# Patient Record
Sex: FEMALE | Race: OTHER | HISPANIC OR LATINO | URBAN - METROPOLITAN AREA
[De-identification: names, ages, dates, MRNs, and addresses within clinical notes are randomized per-mention and may not be internally consistent; named-entity substitution may affect disease eponyms.]

---

## 2022-10-26 ENCOUNTER — EMERGENCY (EMERGENCY)
Facility: HOSPITAL | Age: 47
LOS: 1 days | Discharge: HOME | End: 2022-10-26
Attending: STUDENT IN AN ORGANIZED HEALTH CARE EDUCATION/TRAINING PROGRAM | Admitting: EMERGENCY MEDICINE
Payer: MEDICAID

## 2022-10-26 VITALS
SYSTOLIC BLOOD PRESSURE: 129 MMHG | HEART RATE: 65 BPM | OXYGEN SATURATION: 100 % | TEMPERATURE: 98 F | DIASTOLIC BLOOD PRESSURE: 81 MMHG | RESPIRATION RATE: 20 BRPM

## 2022-10-26 LAB
ALBUMIN SERPL ELPH-MCNC: 4.2 G/DL — SIGNIFICANT CHANGE UP (ref 3.3–5)
ALP SERPL-CCNC: 77 U/L — SIGNIFICANT CHANGE UP (ref 40–120)
ALT FLD-CCNC: 17 U/L — SIGNIFICANT CHANGE UP (ref 4–33)
ANION GAP SERPL CALC-SCNC: 12 MMOL/L — SIGNIFICANT CHANGE UP (ref 7–14)
APPEARANCE UR: CLEAR — SIGNIFICANT CHANGE UP
AST SERPL-CCNC: 20 U/L — SIGNIFICANT CHANGE UP (ref 4–32)
B PERT DNA SPEC QL NAA+PROBE: SIGNIFICANT CHANGE UP
B PERT+PARAPERT DNA PNL SPEC NAA+PROBE: SIGNIFICANT CHANGE UP
BASOPHILS # BLD AUTO: 0.05 K/UL — SIGNIFICANT CHANGE UP (ref 0–0.2)
BASOPHILS NFR BLD AUTO: 0.6 % — SIGNIFICANT CHANGE UP (ref 0–2)
BILIRUB SERPL-MCNC: 0.3 MG/DL — SIGNIFICANT CHANGE UP (ref 0.2–1.2)
BILIRUB UR-MCNC: NEGATIVE — SIGNIFICANT CHANGE UP
BORDETELLA PARAPERTUSSIS (RAPRVP): SIGNIFICANT CHANGE UP
BUN SERPL-MCNC: 10 MG/DL — SIGNIFICANT CHANGE UP (ref 7–23)
C PNEUM DNA SPEC QL NAA+PROBE: SIGNIFICANT CHANGE UP
CALCIUM SERPL-MCNC: 8.8 MG/DL — SIGNIFICANT CHANGE UP (ref 8.4–10.5)
CHLORIDE SERPL-SCNC: 103 MMOL/L — SIGNIFICANT CHANGE UP (ref 98–107)
CO2 SERPL-SCNC: 25 MMOL/L — SIGNIFICANT CHANGE UP (ref 22–31)
COLOR SPEC: COLORLESS — SIGNIFICANT CHANGE UP
CREAT SERPL-MCNC: 0.78 MG/DL — SIGNIFICANT CHANGE UP (ref 0.5–1.3)
DIFF PNL FLD: NEGATIVE — SIGNIFICANT CHANGE UP
EGFR: 95 ML/MIN/1.73M2 — SIGNIFICANT CHANGE UP
EOSINOPHIL # BLD AUTO: 0.1 K/UL — SIGNIFICANT CHANGE UP (ref 0–0.5)
EOSINOPHIL NFR BLD AUTO: 1.2 % — SIGNIFICANT CHANGE UP (ref 0–6)
FLUAV SUBTYP SPEC NAA+PROBE: SIGNIFICANT CHANGE UP
FLUBV RNA SPEC QL NAA+PROBE: SIGNIFICANT CHANGE UP
GLUCOSE SERPL-MCNC: 91 MG/DL — SIGNIFICANT CHANGE UP (ref 70–99)
GLUCOSE UR QL: NEGATIVE — SIGNIFICANT CHANGE UP
HADV DNA SPEC QL NAA+PROBE: SIGNIFICANT CHANGE UP
HCG SERPL-ACNC: <5 MIU/ML — SIGNIFICANT CHANGE UP
HCOV 229E RNA SPEC QL NAA+PROBE: SIGNIFICANT CHANGE UP
HCOV HKU1 RNA SPEC QL NAA+PROBE: SIGNIFICANT CHANGE UP
HCOV NL63 RNA SPEC QL NAA+PROBE: SIGNIFICANT CHANGE UP
HCOV OC43 RNA SPEC QL NAA+PROBE: SIGNIFICANT CHANGE UP
HCT VFR BLD CALC: 38.6 % — SIGNIFICANT CHANGE UP (ref 34.5–45)
HGB BLD-MCNC: 13 G/DL — SIGNIFICANT CHANGE UP (ref 11.5–15.5)
HMPV RNA SPEC QL NAA+PROBE: SIGNIFICANT CHANGE UP
HPIV1 RNA SPEC QL NAA+PROBE: SIGNIFICANT CHANGE UP
HPIV2 RNA SPEC QL NAA+PROBE: SIGNIFICANT CHANGE UP
HPIV3 RNA SPEC QL NAA+PROBE: SIGNIFICANT CHANGE UP
HPIV4 RNA SPEC QL NAA+PROBE: SIGNIFICANT CHANGE UP
IANC: 5.48 K/UL — SIGNIFICANT CHANGE UP (ref 1.8–7.4)
IMM GRANULOCYTES NFR BLD AUTO: 0.3 % — SIGNIFICANT CHANGE UP (ref 0–0.9)
KETONES UR-MCNC: NEGATIVE — SIGNIFICANT CHANGE UP
LEUKOCYTE ESTERASE UR-ACNC: NEGATIVE — SIGNIFICANT CHANGE UP
LIDOCAIN IGE QN: 14 U/L — SIGNIFICANT CHANGE UP (ref 7–60)
LYMPHOCYTES # BLD AUTO: 2.28 K/UL — SIGNIFICANT CHANGE UP (ref 1–3.3)
LYMPHOCYTES # BLD AUTO: 26.5 % — SIGNIFICANT CHANGE UP (ref 13–44)
M PNEUMO DNA SPEC QL NAA+PROBE: SIGNIFICANT CHANGE UP
MCHC RBC-ENTMCNC: 28.8 PG — SIGNIFICANT CHANGE UP (ref 27–34)
MCHC RBC-ENTMCNC: 33.7 GM/DL — SIGNIFICANT CHANGE UP (ref 32–36)
MCV RBC AUTO: 85.6 FL — SIGNIFICANT CHANGE UP (ref 80–100)
MONOCYTES # BLD AUTO: 0.65 K/UL — SIGNIFICANT CHANGE UP (ref 0–0.9)
MONOCYTES NFR BLD AUTO: 7.6 % — SIGNIFICANT CHANGE UP (ref 2–14)
NEUTROPHILS # BLD AUTO: 5.48 K/UL — SIGNIFICANT CHANGE UP (ref 1.8–7.4)
NEUTROPHILS NFR BLD AUTO: 63.8 % — SIGNIFICANT CHANGE UP (ref 43–77)
NITRITE UR-MCNC: NEGATIVE — SIGNIFICANT CHANGE UP
NRBC # BLD: 0 /100 WBCS — SIGNIFICANT CHANGE UP (ref 0–0)
NRBC # FLD: 0 K/UL — SIGNIFICANT CHANGE UP (ref 0–0)
PH UR: 6 — SIGNIFICANT CHANGE UP (ref 5–8)
PLATELET # BLD AUTO: 339 K/UL — SIGNIFICANT CHANGE UP (ref 150–400)
POTASSIUM SERPL-MCNC: 3.3 MMOL/L — LOW (ref 3.5–5.3)
POTASSIUM SERPL-SCNC: 3.3 MMOL/L — LOW (ref 3.5–5.3)
PROT SERPL-MCNC: 7.4 G/DL — SIGNIFICANT CHANGE UP (ref 6–8.3)
PROT UR-MCNC: NEGATIVE — SIGNIFICANT CHANGE UP
RAPID RVP RESULT: SIGNIFICANT CHANGE UP
RBC # BLD: 4.51 M/UL — SIGNIFICANT CHANGE UP (ref 3.8–5.2)
RBC # FLD: 13.3 % — SIGNIFICANT CHANGE UP (ref 10.3–14.5)
RSV RNA SPEC QL NAA+PROBE: SIGNIFICANT CHANGE UP
RV+EV RNA SPEC QL NAA+PROBE: SIGNIFICANT CHANGE UP
SARS-COV-2 RNA SPEC QL NAA+PROBE: SIGNIFICANT CHANGE UP
SODIUM SERPL-SCNC: 140 MMOL/L — SIGNIFICANT CHANGE UP (ref 135–145)
SP GR SPEC: 1.01 — LOW (ref 1.01–1.05)
UROBILINOGEN FLD QL: SIGNIFICANT CHANGE UP
WBC # BLD: 8.59 K/UL — SIGNIFICANT CHANGE UP (ref 3.8–10.5)
WBC # FLD AUTO: 8.59 K/UL — SIGNIFICANT CHANGE UP (ref 3.8–10.5)

## 2022-10-26 PROCEDURE — G1004: CPT

## 2022-10-26 PROCEDURE — 76830 TRANSVAGINAL US NON-OB: CPT | Mod: 26

## 2022-10-26 PROCEDURE — 74177 CT ABD & PELVIS W/CONTRAST: CPT | Mod: 26,ME

## 2022-10-26 PROCEDURE — 99284 EMERGENCY DEPT VISIT MOD MDM: CPT

## 2022-10-26 RX ORDER — KETOROLAC TROMETHAMINE 30 MG/ML
15 SYRINGE (ML) INJECTION ONCE
Refills: 0 | Status: DISCONTINUED | OUTPATIENT
Start: 2022-10-26 | End: 2022-10-26

## 2022-10-26 RX ADMIN — Medication 15 MILLIGRAM(S): at 21:33

## 2022-10-26 NOTE — ED PROVIDER NOTE - PATIENT PORTAL LINK FT
You can access the FollowMyHealth Patient Portal offered by [facility] by registering at the following website: [email or web address]. By joining [employer]’s FollowMyHealth portal, you will also be able to view your health information using other applications (apps) compatible with our system.

## 2022-10-26 NOTE — ED PROVIDER NOTE - OBJECTIVE STATEMENT
47F, no sig pmh, who presents with abdominal pain. Reports intermittent RLQ abdominal pain for the past 3 days. Non-radiating, non-positional. No dysuria, hematuria. Does not have regular menstruations because of her IUD. Was sent in by her PMD for rule out appy. No vaginal bleeding/discharge. No headaches, fevers, chills, cough, sputum, cp, sob, nvd, recent travel, trauma, syncope.

## 2022-10-26 NOTE — ED PROVIDER NOTE - PROGRESS NOTE DETAILS
Desirae att: The pt's pain resolved, COnsulted with gyn whose recs are as follows: - No acute GYN intervention   - f/u outpatient for TVUS in 3 months to assess for cyst resolution  - Pt may follow up with [facility] [phone].   - Return precautions reviewed  - Recommend NSAIDs for analgesia, alternating with Tylenol. Recommend heat packs.

## 2022-10-26 NOTE — ED PROVIDER NOTE - NSFOLLOWUPINSTRUCTIONS_ED_ALL_ED_FT
- f/u outpatient for TVUS in 3 months to assess for cyst resolution  - Pt may follow up with [facility] [phone].   - Recommend NSAIDs for analgesia, alternating with Tylenol. Recommend heat packs.

## 2022-10-26 NOTE — ED PROVIDER NOTE - CLINICAL SUMMARY MEDICAL DECISION MAKING FREE TEXT BOX
47F, no sig pmh, who presents with a few days of RLQ pain. Sent in by PMD for rule out appendicitis. No urinary sxs. No vaginal sxs. No hx of kidney stones. Will obtain labs, urine, CT imaging.

## 2022-10-26 NOTE — ED ADULT NURSE NOTE - OBJECTIVE STATEMENT
break coverage rn- pt c/o RLQ abdominal pain x 6 days. appears to be in no distress. denies any complaints. no respiratory distress noted. 20 gauge iv placed in the left ac, labs sent. awaiting further plan

## 2022-10-27 VITALS
RESPIRATION RATE: 18 BRPM | DIASTOLIC BLOOD PRESSURE: 75 MMHG | SYSTOLIC BLOOD PRESSURE: 104 MMHG | TEMPERATURE: 98 F | OXYGEN SATURATION: 98 % | HEART RATE: 64 BPM

## 2022-10-27 PROCEDURE — 99243 OFF/OP CNSLTJ NEW/EST LOW 30: CPT | Mod: GC

## 2022-10-27 NOTE — CONSULT NOTE ADULT - SUBJECTIVE AND OBJECTIVE BOX
МАРИЯ LO  47y  Female [id number]    47 year old  LMP 9 years ago (IUD in place) presenting with RLQ pain for 2 weeks. Pt describes the pain as dull, aching in nature. Not radiating. Waxing and waning, but pt feels that it is there most of the time. Pt was seen by her PCP today and they recommended ED evaluation to rule out appendicitis. Pt denies nausea, vomiting, denies fevers/chills. Regular bowel movements, last yesterday. Denies diarrhea or sick contacts. No vaginal bleeding, no abnormal vaginal discharge.     Name of GYN Physician: none     Past OB:  CS x1,  x2, SAB x2     Past gyn: Denies fibroids, cysts, endometriosis, STI's, Abnormal pap smears     Home meds: none     Allergies: NKDA     PAST MEDICAL & SURGICAL HISTORY:  - No medical history   - CS x1     Social History:  Denies smoking use, drug use, alcohol use.       Vital Signs Last 24 Hrs  T(C): 36.9 (26 Oct 2022 18:21), Max: 36.9 (26 Oct 2022 18:21)  T(F): 98.4 (26 Oct 2022 18:21), Max: 98.4 (26 Oct 2022 18:21)  HR: 58 (26 Oct 2022 18:21) (58 - 65)  BP: 146/98 (26 Oct 2022 18:21) (129/81 - 146/98)  BP(mean): --  RR: 16 (26 Oct 2022 18:21) (16 - 20)  SpO2: 100% (26 Oct 2022 18:21) (100% - 100%)    Parameters below as of 26 Oct 2022 18:21  Patient On (Oxygen Delivery Method): room air        Physical Exam:   General: sitting comfortably in bed, NAD   CV: RR S1S2 no m/r/g  Lungs: CTA b/l, good air flow b/l   Back: No CVA tenderness  Abd: Soft, non-tender, non-distended.  Bowel sounds present.    :  No bleeding on pad.    External labia wnl.  Bimanual exam with no cervical motion tenderness, uterus wnl, adnexa non palpable b/l.  Cervix closed vs. Cervix dilated    cm   Speculum Exam: No active bleeding from os.  Physiologic discharge.    Ext: non-tender b/l, no edema     LABS:                              13.0   8.59  )-----------( 339      ( 26 Oct 2022 15:20 )             38.6     10-26    140  |  103  |  10  ----------------------------<  91  3.3<L>   |  25  |  0.78    Ca    8.8      26 Oct 2022 15:20    TPro  7.4  /  Alb  4.2  /  TBili  0.3  /  DBili  x   /  AST  20  /  ALT  17  /  AlkPhos  77  10-26    I&O's Detail      Urinalysis Basic - ( 26 Oct 2022 16:00 )    Color: Colorless / Appearance: Clear / S.006 / pH: x  Gluc: x / Ketone: Negative  / Bili: Negative / Urobili: <2 mg/dL   Blood: x / Protein: Negative / Nitrite: Negative   Leuk Esterase: Negative / RBC: x / WBC x   Sq Epi: x / Non Sq Epi: x / Bacteria: x        RADIOLOGY & ADDITIONAL STUDIES: МАРИЯ LO  47y  Female [id number]    47 year old  LMP 9 years ago (IUD in place) presenting with RLQ pain for 2 weeks. Pt describes the pain as dull, aching in nature. Not radiating. Waxing and waning, but pt feels that it is there most of the time. Pt was seen by her PCP today and they recommended ED evaluation to rule out appendicitis. Pt denies nausea, vomiting, denies fevers/chills. Regular bowel movements, last yesterday. Denies diarrhea or sick contacts. No vaginal bleeding, no abnormal vaginal discharge.     Name of GYN Physician: none     Past OB:  CS x1,  x2, SAB x2     Past gyn: Denies fibroids, cysts, endometriosis, STI's, Abnormal pap smears     Home meds: none     Allergies: NKDA     PAST MEDICAL & SURGICAL HISTORY:  - No medical history   - CS x1     Social History:  Denies smoking use, drug use, alcohol use.       Vital Signs Last 24 Hrs  T(C): 36.9 (26 Oct 2022 18:21), Max: 36.9 (26 Oct 2022 18:21)  T(F): 98.4 (26 Oct 2022 18:21), Max: 98.4 (26 Oct 2022 18:21)  HR: 58 (26 Oct 2022 18:21) (58 - 65)  BP: 146/98 (26 Oct 2022 18:21) (129/81 - 146/98)  BP(mean): --  RR: 16 (26 Oct 2022 18:21) (16 - 20)  SpO2: 100% (26 Oct 2022 18:21) (100% - 100%)    Parameters below as of 26 Oct 2022 18:21  Patient On (Oxygen Delivery Method): room air        Physical Exam:   General: sitting comfortably in bed, NAD   Abd: Soft, non-tender, non-distended.    :  No bleeding on pad.    External labia wnl.  Bimanual exam with no cervical motion tenderness, uterus wnl, adnexa non palpable b/l.  Cervix closed   Speculum Exam: No active bleeding from os.  Physiologic discharge.    Ext: non-tender b/l, no edema     LABS:                              13.0   8.59  )-----------( 339      ( 26 Oct 2022 15:20 )             38.6     10-    140  |  103  |  10  ----------------------------<  91  3.3<L>   |  25  |  0.78    Ca    8.8      26 Oct 2022 15:20    TPro  7.4  /  Alb  4.2  /  TBili  0.3  /  DBili  x   /  AST  20  /  ALT  17  /  AlkPhos  77  10-26    I&O's Detail      Urinalysis Basic - ( 26 Oct 2022 16:00 )    Color: Colorless / Appearance: Clear / S.006 / pH: x  Gluc: x / Ketone: Negative  / Bili: Negative / Urobili: <2 mg/dL   Blood: x / Protein: Negative / Nitrite: Negative   Leuk Esterase: Negative / RBC: x / WBC x   Sq Epi: x / Non Sq Epi: x / Bacteria: x        RADIOLOGY & ADDITIONAL STUDIES:    < from: CT Abdomen and Pelvis w/ IV Cont (10.26.22 @ 19:02) >  CT ABDOMEN AND PELVIS IC                          PROCEDURE DATE:  10/26/2022          INTERPRETATION:  CLINICAL INFORMATION: Right lower quadrant abdominal pain    COMPARISON: None.    CONTRAST/COMPLICATIONS:  IV Contrast: Omnipaque 350  90 cc administered   10 cc discarded  Oral Contrast: NONE  Complications: None reported at time of study completion    PROCEDURE:  CT of the Abdomen and Pelvis was performed.  Sagittal and coronal reformats were performed.    FINDINGS:  LOWER CHEST: Within normal limits.    LIVER: Within normal limits.  BILE DUCTS: Normal caliber.  GALLBLADDER: Within normal limits.  SPLEEN: Within normal limits.  PANCREAS: Within normal limits.  ADRENALS: Within normal limits.  KIDNEYS/URETERS: Withinnormal limits. No evidence for a ureteral   calculus. No hydronephrosis.    BLADDER: Within normal limits.  REPRODUCTIVE ORGANS: IUD in the endometrium. The left adnexa appears   grossly unremarkable. 4.1 cm hypodense lesion in the right adnexa,   suggestive of a right ovarian cyst. Pelvic ultrasound may be pursued for   further evaluation.    BOWEL: No bowel obstruction or grossly thickened bowel wall. Appendix   within normal limits.  PERITONEUM: No free air or ascites.  VESSELS: Within normal limits.  RETROPERITONEUM/LYMPH NODES: No lymphadenopathy.  ABDOMINAL WALL: Small fat-containing umbilical hernia.  BONES: Mild degenerative spondylosis. Grade 1 anterolisthesis at L5-S1.   Bilateral L5 spondylolysis.    IMPRESSION:  4.1 cm hypodense lesion in the right adnexa, suggestive of a right   ovarian cyst. Pelvic ultrasound may be pursued for further evaluation.    No bowel obstruction or grossly thickened bowel wall. Appendix within   normal limits.    --- End of Report ---          < end of copied text >    < from: US Transvaginal (10.26.22 @ 23:15) >  US TRANSVAGINAL                          PROCEDURE DATE:  10/26/2022          INTERPRETATION:  CLINICAL INFORMATION: Right lower quadrant pain. 4 cm   cyst.    LMP: Unknown.    COMPARISON: CT abdomen and pelvis from 10/26/2022.    TECHNIQUE:  Endovaginal pelvic sonogram only. Color and Spectral Doppler was   performed.    FINDINGS:  Uterus: 12.8 cm x 4.7 cm x 5.5 cm. Intrauterine device in place.  Endometrium: 0.7 cm.. Within normal limits.    Right ovary: 5.7 cm x 3.8 cm x5.8 cm, inclusive of a 3.6 x 3.4 x 4.8 cm   cyst. Normal arterial and venous waveforms.  Left ovary: 2.9 cm x 2.2 cm x 2.1 cm. Within normal limits. Normal venous   waveforms.    Fluid: None.    IMPRESSION:  4.8 cm right ovarian cyst, with preserved peripheral ovarian waveforms.   Given size, cyst may serve as a nidus for intermittent ovarian torsion,   which should be excluded on a clinical basis.        --- End of Report ---      < end of copied text >

## 2022-10-27 NOTE — CONSULT NOTE ADULT - ASSESSMENT
- Likely f/u TVUS in 3 months to assess for cyst resolution  - Pt may follow up with [facility] [phone].   - Return precautions reviewed  - Recommend NSAIDs for analgesia, alternating with Tylenol. Recommend heat packs.     Abida Latham, PGY2  d/w Dr. Nick 47 year old  LMP 9 years ago (IUD in place) presenting with RLQ pain for 2 weeks. Pt with 4.8cm cyst on TVUS. Appendix wnl. No WBC. Other labs wnl. VSS. Low suspicion of torsion at this time given clinical picture and symptoms/exam. Pain possibly related to ovarian cyst.     - No acute GYN intervention   - f/u outpatient for TVUS in 3 months to assess for cyst resolution  - Pt may follow up with [facility] [phone].   - Return precautions reviewed  - Recommend NSAIDs for analgesia, alternating with Tylenol. Recommend heat packs.     Abida Latham, PGY2  d/w Dr. Nick

## 2022-10-27 NOTE — CONSULT NOTE ADULT - ATTENDING COMMENTS
46yo  with IUD in place (last period 9 yrs ago) and known R ovarian cyst for at least 1 yr who p/w RLQ pain for 3 days. She says the pain is sharp, constant, improved now after IV Toradol. Says she had a similar episode 1 yr ago and was found to have a R ovarian cyst at that time, does not remember the size. She was seen by her PCP today who was concerned for possible appendicitis and sent her to the ED. WBC 8.6. Afebrile, VSS, abdomen mildly tender to very deep palpation, no rebound/guarding. TVUS shows uterus with IUD in place, 5cm R ovarian cyst with normal arterial and venous waveform. CT shows no bowel obstruction or grossly thickened bowel wall and appendix wnl. Discussed with patient no indication for acute Gyn intervention at this time. Recommend out-patient surveillance of ovarian cyst with sonogram in 3 months and follow up with her Gyn in 1-2 weeks. Strict return precautions discussed.      Park ORTA  ObGyn Service Attending

## 2022-10-28 LAB
CULTURE RESULTS: SIGNIFICANT CHANGE UP
SPECIMEN SOURCE: SIGNIFICANT CHANGE UP